# Patient Record
Sex: FEMALE | Race: WHITE
[De-identification: names, ages, dates, MRNs, and addresses within clinical notes are randomized per-mention and may not be internally consistent; named-entity substitution may affect disease eponyms.]

---
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
## 2018-01-12 ENCOUNTER — HOSPITAL ENCOUNTER (EMERGENCY)
Dept: HOSPITAL 26 - MED | Age: 18
Discharge: HOME | End: 2018-01-12
Payer: MEDICARE

## 2018-01-12 VITALS — DIASTOLIC BLOOD PRESSURE: 77 MMHG | SYSTOLIC BLOOD PRESSURE: 117 MMHG

## 2018-01-12 VITALS — DIASTOLIC BLOOD PRESSURE: 86 MMHG | SYSTOLIC BLOOD PRESSURE: 133 MMHG

## 2018-01-12 VITALS — HEIGHT: 62 IN | BODY MASS INDEX: 43.98 KG/M2 | WEIGHT: 239 LBS

## 2018-01-12 DIAGNOSIS — R11.2: ICD-10-CM

## 2018-01-12 DIAGNOSIS — Z91.030: ICD-10-CM

## 2018-01-12 DIAGNOSIS — R05: ICD-10-CM

## 2018-01-12 DIAGNOSIS — N39.0: Primary | ICD-10-CM

## 2018-01-12 DIAGNOSIS — Z90.89: ICD-10-CM

## 2018-01-12 PROCEDURE — 81002 URINALYSIS NONAUTO W/O SCOPE: CPT

## 2018-01-12 PROCEDURE — 96372 THER/PROPH/DIAG INJ SC/IM: CPT

## 2018-01-12 PROCEDURE — 81025 URINE PREGNANCY TEST: CPT

## 2018-01-12 PROCEDURE — 99283 EMERGENCY DEPT VISIT LOW MDM: CPT

## 2018-01-12 NOTE — NUR
Patient discharged with v/s stable. Written and verbal after care instructions 
given and explained. 

Patient alert, oriented and verbalized understanding of instructions. 
Ambulatory with steady gait. All questions addressed prior to discharge. ID 
band removed. Patient advised to follow up with PMD. Rx of Prednisone, Motrin, 
Zofran, and Cipro given. Patient educated on indication of medication including 
possible reaction and side effects. Opportunity to ask questions provided and 
answered.

## 2018-01-12 NOTE — NUR
17/F CAME IN W C/O COUGH, INTERMITTENT FEVER, HEADACHE, SORE THROAT X 5 DAYS. 
PT AFEBRILE AT THIS TIME. ALL LUNG SOUNDS CBTA, 20RR EVEN AND UNLABORED. DENIES 
OTHER PMH/RX/OTC

## 2018-06-09 ENCOUNTER — HOSPITAL ENCOUNTER (EMERGENCY)
Dept: HOSPITAL 26 - MED | Age: 18
Discharge: HOME | End: 2018-06-09
Payer: MEDICARE

## 2018-06-09 VITALS — WEIGHT: 244 LBS | HEIGHT: 62 IN | BODY MASS INDEX: 44.9 KG/M2

## 2018-06-09 VITALS — SYSTOLIC BLOOD PRESSURE: 134 MMHG | DIASTOLIC BLOOD PRESSURE: 83 MMHG

## 2018-06-09 VITALS — DIASTOLIC BLOOD PRESSURE: 84 MMHG | SYSTOLIC BLOOD PRESSURE: 128 MMHG

## 2018-06-09 DIAGNOSIS — Z91.030: ICD-10-CM

## 2018-06-09 DIAGNOSIS — J32.9: Primary | ICD-10-CM

## 2018-06-09 DIAGNOSIS — J40: ICD-10-CM

## 2018-06-09 LAB
APPEARANCE UR: CLEAR
BARBITURATES UR QL SCN: (no result) NG/ML
BENZODIAZ UR QL SCN: (no result) NG/ML
BILIRUB UR QL STRIP: NEGATIVE
BZE UR QL SCN: (no result) NG/ML
CANNABINOIDS UR QL SCN: (no result) NG/ML
COLOR UR: YELLOW
GLUCOSE UR STRIP-MCNC: NEGATIVE MG/DL
HGB UR QL STRIP: NEGATIVE
LEUKOCYTE ESTERASE UR QL STRIP: NEGATIVE
NITRITE UR QL STRIP: NEGATIVE
OPIATES UR QL SCN: (no result) NG/ML
PCP UR QL SCN: (no result) NG/ML
PH UR STRIP: 6.5 [PH] (ref 5–9)
RBC #/AREA URNS HPF: (no result) /HPF (ref 0–5)
WBC,URINE: (no result) /HPF (ref 0–5)

## 2018-06-09 PROCEDURE — 94640 AIRWAY INHALATION TREATMENT: CPT

## 2018-06-09 PROCEDURE — 99284 EMERGENCY DEPT VISIT MOD MDM: CPT

## 2018-06-09 PROCEDURE — 80305 DRUG TEST PRSMV DIR OPT OBS: CPT

## 2018-06-09 PROCEDURE — 81001 URINALYSIS AUTO W/SCOPE: CPT

## 2018-06-09 PROCEDURE — 81025 URINE PREGNANCY TEST: CPT

## 2018-06-09 PROCEDURE — 96372 THER/PROPH/DIAG INJ SC/IM: CPT

## 2018-06-09 NOTE — NUR
PATIENT PRESENTS TO ED WITH COMPLAINTS OF CHEST PAIN UPON COUGHING X 3 WEEKS. 
PRODUCTIVE COUGH PER PATIENT. DENIES N/V/D; SKIN IS PINK/WARM/DRY; AAOX4 WITH 
EVEN AND STEADY GAIT; LUNGS CLEAR BL; HR EVEN AND REGULAR; PT DENIES ANY FEVER; 
PATIENT STATES PAIN OF 7/10 AT THIS TIME; VSS; PATIENT POSITIONED FOR COMFORT; 
HOB ELEVATED; BEDRAILS UP X1; BED DOWN. ER MD MADE AWARE OF PT STATUS.

## 2018-08-07 ENCOUNTER — HOSPITAL ENCOUNTER (EMERGENCY)
Dept: HOSPITAL 26 - MED | Age: 18
Discharge: HOME | End: 2018-08-07
Payer: MEDICARE

## 2018-08-07 VITALS — BODY MASS INDEX: 46.01 KG/M2 | WEIGHT: 250 LBS | HEIGHT: 62 IN

## 2018-08-07 VITALS — SYSTOLIC BLOOD PRESSURE: 133 MMHG | DIASTOLIC BLOOD PRESSURE: 66 MMHG

## 2018-08-07 VITALS — DIASTOLIC BLOOD PRESSURE: 66 MMHG | SYSTOLIC BLOOD PRESSURE: 133 MMHG

## 2018-08-07 DIAGNOSIS — Z91.030: ICD-10-CM

## 2018-08-07 DIAGNOSIS — J02.9: Primary | ICD-10-CM

## 2018-08-07 DIAGNOSIS — H57.8: ICD-10-CM

## 2018-08-07 PROCEDURE — 81002 URINALYSIS NONAUTO W/O SCOPE: CPT

## 2018-08-07 PROCEDURE — 96372 THER/PROPH/DIAG INJ SC/IM: CPT

## 2018-08-07 PROCEDURE — 87081 CULTURE SCREEN ONLY: CPT

## 2018-08-07 PROCEDURE — 81025 URINE PREGNANCY TEST: CPT

## 2018-08-07 PROCEDURE — 99284 EMERGENCY DEPT VISIT MOD MDM: CPT

## 2018-08-07 NOTE — NUR
Patient discharged with v/s stable. Written and verbal after care instructions 
given and explained. 

Patient alert, oriented and verbalized understanding of instructions. 
Ambulatory with steady gait. All questions addressed prior to discharge. ID 
band removed. Patient advised to follow up with PMD. Rx of naprosyn 500mg, 
tylenol #3 with codeine,Amoxicillin 500mg given. Patient educated on indication 
of medication including possible reaction and side effects. Opportunity to ask 
questions provided and answered.

## 2018-08-07 NOTE — NUR
16 Y/O F BIB SELF W/C/O FEVER, RUNNY NOSE, AND SORE THROAT X1DAY. PT STATES N/V 
DENIES DIARRHEA ; SKIN IS INTACT, PINK/WARM/DRY; AAOX4, PERRL, WITH EVEN AND 
STEADY GAIT; LUNGS CLEAR BL, BREATHING UNLABORED; HR EVEN AND REGULAR, BL 
PERIPHERAL PULSES PRESENT; BS ACTIVE X4, NO TENDERNESS TO PALPATION, NO 
HEPATOSPLENOMEGALLY PALPATED, RESONANT TO PERCUSSION; PT STATES FEVER, DENIES 
CP, SOB, OR COUGH AT THIS TIME; PT STATES 10/10 PAIN AT THIS TIME IN HEAD THAT 
FEELS SEVERE; VSS; PATIENT POSITIONED FOR COMFORT; HOB ELEVATED; BEDRAILS UP 
X2; BED DOWN.